# Patient Record
Sex: MALE | Race: WHITE | URBAN - METROPOLITAN AREA
[De-identification: names, ages, dates, MRNs, and addresses within clinical notes are randomized per-mention and may not be internally consistent; named-entity substitution may affect disease eponyms.]

---

## 2019-07-11 ENCOUNTER — APPOINTMENT (RX ONLY)
Dept: URBAN - METROPOLITAN AREA CLINIC 304 | Facility: CLINIC | Age: 24
Setting detail: DERMATOLOGY
End: 2019-07-11

## 2019-07-11 DIAGNOSIS — B00.1 HERPESVIRAL VESICULAR DERMATITIS: ICD-10-CM

## 2019-07-11 DIAGNOSIS — L90.5 SCAR CONDITIONS AND FIBROSIS OF SKIN: ICD-10-CM

## 2019-07-11 PROCEDURE — 99202 OFFICE O/P NEW SF 15 MIN: CPT

## 2019-07-11 PROCEDURE — ? PRESCRIPTION

## 2019-07-11 PROCEDURE — ? COUNSELING

## 2019-07-11 RX ORDER — VALACYCLOVIR 1 G/1
TABLET ORAL PRN
Qty: 20 | Refills: 1 | Status: ERX | COMMUNITY
Start: 2019-07-11

## 2019-07-11 RX ADMIN — VALACYCLOVIR: 1 TABLET ORAL at 20:49

## 2019-07-11 ASSESSMENT — LOCATION DETAILED DESCRIPTION DERM: LOCATION DETAILED: LEFT UPPER CUTANEOUS LIP

## 2019-07-11 ASSESSMENT — LOCATION ZONE DERM: LOCATION ZONE: LIP

## 2019-07-11 ASSESSMENT — LOCATION SIMPLE DESCRIPTION DERM: LOCATION SIMPLE: LEFT LIP

## 2019-10-14 ENCOUNTER — RX ONLY (OUTPATIENT)
Age: 24
Setting detail: RX ONLY
End: 2019-10-14

## 2019-10-14 RX ORDER — VALACYCLOVIR 1 G/1
TABLET ORAL PRN
Qty: 20 | Refills: 1 | Status: ERX

## 2021-03-12 ENCOUNTER — APPOINTMENT (RX ONLY)
Dept: URBAN - NONMETROPOLITAN AREA CLINIC 35 | Facility: CLINIC | Age: 26
Setting detail: DERMATOLOGY
End: 2021-03-12

## 2021-03-12 DIAGNOSIS — B00.1 HERPESVIRAL VESICULAR DERMATITIS: ICD-10-CM

## 2021-03-12 DIAGNOSIS — L01.01 NON-BULLOUS IMPETIGO: ICD-10-CM

## 2021-03-12 PROCEDURE — ? COUNSELING

## 2021-03-12 PROCEDURE — ? IN-HOUSE DISPENSING PHARMACY

## 2021-03-12 PROCEDURE — 99203 OFFICE O/P NEW LOW 30 MIN: CPT

## 2021-03-12 PROCEDURE — ? PRESCRIPTION

## 2021-03-12 RX ORDER — VALACYCLOVIR 1 G/1
TABLET, FILM COATED ORAL
Qty: 30 | Refills: 6 | Status: ERX | COMMUNITY
Start: 2021-03-12

## 2021-03-12 RX ADMIN — VALACYCLOVIR: 1 TABLET, FILM COATED ORAL at 00:00

## 2021-03-12 ASSESSMENT — LOCATION ZONE DERM: LOCATION ZONE: LIP

## 2021-03-12 ASSESSMENT — LOCATION SIMPLE DESCRIPTION DERM: LOCATION SIMPLE: UPPER LIP

## 2021-03-12 ASSESSMENT — LOCATION DETAILED DESCRIPTION DERM: LOCATION DETAILED: PHILTRUM

## 2021-03-12 NOTE — HPI: SKIN LESION
How Severe Is Your Skin Lesion?: mild
Has Your Skin Lesion Been Treated?: not been treated
Is This A New Presentation, Or A Follow-Up?: Skin Lesion
Additional History: Pt states that he has a history of cold sores and believes this lesion is the same. He would like to discuss a treatment plan as taking valtrex at onset did not help this time and sore is enlarging.

## 2021-03-12 NOTE — PROCEDURE: IN-HOUSE DISPENSING PHARMACY
Product 58 Refills: 0
Product 47 Unit Type: mg
Product 16 Price/Unit (In Dollars): 42
Detail Level: Detailed
Product 12 Unit Type: ml
Product 10 Amount/Unit (Numbers Only): 30
Product 8 Amount/Unit (Numbers Only): 60
Name Of Product 10: Desox 0.025% ointment\\nDesoximatasone 0.25%/Niacinamide 4%
Name Of Product 16: Ivermec 1%/Metron 1% Rosacea Gel
Product 6 Price/Unit (In Dollars): 40
Product 1 Price/Unit (In Dollars): 48
Send Charges To Patient Encounter: Yes
Name Of Product 2: metrogel 1%
Product 12 Amount/Unit (Numbers Only): 120
Name Of Product 5: Alopecia Topical Solution for Women\\nMinoxidil 7%/Progesterone 0.1%
Product 9 Unit Type: grams
Name Of Product 18: Wart Gel\\nCimetidine 5%/Ibuprofen 2%/Salicylic Acid 17%
Name Of Product 17: Scar Silicone Gel \\nPentoxifylline 0.5%/Tranilast 1%/Triamcinolone Acetonide 0.1%/Zinc oxide 5%
Name Of Product 4: Alopecia Topical Solution for Men\\nFinasteride 0.1%/Minoxidil 7%
Name Of Product 7: Anti-fungal Econaz 1% cream \\nEconazole Nitrate 1%/Niacinamide 4%
Name Of Product 8: Clob 0.05% Topical Solution \\nClobetasol Propionate 0.05%/Niacinamide 4%
Product 4 Price/Unit (In Dollars): 45
Product 2 Application Directions: AAA QD
Product 6 Amount/Unit (Numbers Only): 15
Product 21 Units Dispensed: 1
Product 13 Price/Unit (In Dollars): 53
Name Of Product 20: Tret 0.05% w/ HA\\nHyaluronic Acid 0.5%/Niacinamide 4%/Tretinoin 0.05%
Product 2 Price/Unit (In Dollars): 42.00
Name Of Product 1: Dap 6% Acne Gel\\nDapsone 6%/Niacinmide 4%
Name Of Product 21: Antibacterial Wound Petrolatum Ointment ; aloe vera 0.2% lidocaine 2% mupirocin 2%
Name Of Product 19: Bruise Healing Cream \\nArnica 2%/Ascorbic Acid 20%/Bromelain 5%/NIacinamide 4%/Phytoadione 1%
Name Of Product 15: Metron 1% Rosacea Gel \\nMetronidazole 1%/Niacinamide 4%
Name Of Product 14: Hydrocort 2.5%/Keto 2% Fungal cream
Name Of Product 11: Mometasone 0.1% wHA Cream\\nHyaluronic Acid 2%/Mometasone Furoate 0.1%/Niacinamide 4%
Name Of Product 6: Anti-fungal nail solution\\nCiclopirox 8%/Fluconazole 1%/Terbinafine 1%
Name Of Product 9: Clob 0.05% ointment\\nClobetasol Propionate 0.05%/NIacinamide 4%
Name Of Product 13: Tacro 0.1% Ointment\\nNIacinamide 4%/Tacrolimus 0.1%
Name Of Product 12: Anti-fungal keto 2% shampoo\\nKetoconazole 2%/Zinc Pyrithione 1%/Salicylic Aci 2%

## 2023-11-14 RX ORDER — VALACYCLOVIR 1 G/1
TABLET, FILM COATED ORAL
Qty: 30 | Refills: 6 | Status: ERX